# Patient Record
Sex: FEMALE | Race: WHITE
[De-identification: names, ages, dates, MRNs, and addresses within clinical notes are randomized per-mention and may not be internally consistent; named-entity substitution may affect disease eponyms.]

---

## 2023-03-16 ENCOUNTER — DASHBOARD ENCOUNTERS (OUTPATIENT)
Age: 53
End: 2023-03-16

## 2023-03-16 ENCOUNTER — OFFICE VISIT (OUTPATIENT)
Dept: URBAN - METROPOLITAN AREA CLINIC 40 | Facility: CLINIC | Age: 53
End: 2023-03-16
Payer: COMMERCIAL

## 2023-03-16 ENCOUNTER — WEB ENCOUNTER (OUTPATIENT)
Dept: URBAN - METROPOLITAN AREA CLINIC 40 | Facility: CLINIC | Age: 53
End: 2023-03-16

## 2023-03-16 ENCOUNTER — LAB OUTSIDE AN ENCOUNTER (OUTPATIENT)
Dept: URBAN - METROPOLITAN AREA CLINIC 40 | Facility: CLINIC | Age: 53
End: 2023-03-16

## 2023-03-16 VITALS
BODY MASS INDEX: 35.36 KG/M2 | HEIGHT: 66 IN | WEIGHT: 220 LBS | SYSTOLIC BLOOD PRESSURE: 144 MMHG | DIASTOLIC BLOOD PRESSURE: 88 MMHG | HEART RATE: 96 BPM

## 2023-03-16 DIAGNOSIS — Z12.11 ENCOUNTER FOR SCREENING COLONOSCOPY: ICD-10-CM

## 2023-03-16 DIAGNOSIS — Z87.19 HISTORY OF INFLAMMATORY BOWEL DISEASE: ICD-10-CM

## 2023-03-16 PROBLEM — 34000006 CROHN DISEASE: Status: ACTIVE | Noted: 2023-03-16

## 2023-03-16 PROCEDURE — 99202 OFFICE O/P NEW SF 15 MIN: CPT | Performed by: PHYSICIAN ASSISTANT

## 2023-03-16 RX ORDER — CYCLOSPORINE 0.5 MG/ML
1 DROP INTO AFFECTED EYE EMULSION OPHTHALMIC TWICE A DAY
Status: ACTIVE | COMMUNITY

## 2023-03-16 NOTE — HPI-TODAY'S VISIT:
Ms. Jarvis is a 52-year-old white female who returns the office today to schedule colorectal cancer screening.  She was last seen in 2009 by Dr. Ochoa for colonoscopy with reported history of IBD/Crohn's.  At the time of her procedure, there was no evidence of inflammatory bowel disease.  She did have internal, nonbleeding moderate hemorrhoids on retroflexion.  The mucosa was slightly erythematous.  The distal ileum was normal but biopsied.  The entire colon appeared normal but was biopsied.  Nonthrombosed external hemorrhoids with moderate sentinel skin tags were also noted but there was no evidence of active perianal Crohn's disease.  Biopsies confirmed this and that areas biopsy in the terminal ileum and random colon were without any significant histopathology.  No evidence of active, chronic, or microscopic colitis. She was originally diagnosed with Crohn's disease in 2001 or around that time when she had a prior colonoscopy.  Her last colonoscopy by GI provider at [facility] was Dr. Craft in 2007 where she had right-sided colitis and was asked to start taking colazal. Today, she denies any GI complaints.  Normal bowel habits.  No rectal bleeding.  Denies use of NSAIDs.  No nausea, vomiting or dysphagia.  Good appetite.  She is overweight.  Admits to a fairly balanced diet.  Has a skin rash that is diffuse and has been followed by dermatology with uncertain etiology.  She has been given topical steroids from time to time.  States that she has in fact not been on any long-term medications for IBD since her last visit.  Previously, she did take Pentasa.  She was not able to tolerate Remicade. She was instructed by her OB/GYN provider to schedule CRC screening.

## 2023-05-19 ENCOUNTER — OFFICE VISIT (OUTPATIENT)
Dept: URBAN - METROPOLITAN AREA SURGERY CENTER 30 | Facility: SURGERY CENTER | Age: 53
End: 2023-05-19
Payer: COMMERCIAL

## 2023-05-19 DIAGNOSIS — Z12.11 COLON CANCER SCREENING: ICD-10-CM

## 2023-05-19 PROCEDURE — G0121 COLON CA SCRN NOT HI RSK IND: HCPCS | Performed by: INTERNAL MEDICINE

## 2023-05-19 PROCEDURE — G8907 PT DOC NO EVENTS ON DISCHARG: HCPCS | Performed by: INTERNAL MEDICINE

## 2023-05-19 RX ORDER — CYCLOSPORINE 0.5 MG/ML
1 DROP INTO AFFECTED EYE EMULSION OPHTHALMIC TWICE A DAY
Status: ACTIVE | COMMUNITY

## 2023-06-16 ENCOUNTER — OFFICE VISIT (OUTPATIENT)
Dept: URBAN - METROPOLITAN AREA CLINIC 40 | Facility: CLINIC | Age: 53
End: 2023-06-16

## 2023-06-19 ENCOUNTER — OFFICE VISIT (OUTPATIENT)
Dept: URBAN - METROPOLITAN AREA CLINIC 40 | Facility: CLINIC | Age: 53
End: 2023-06-19

## 2023-07-03 ENCOUNTER — OFFICE VISIT (OUTPATIENT)
Dept: URBAN - METROPOLITAN AREA CLINIC 40 | Facility: CLINIC | Age: 53
End: 2023-07-03